# Patient Record
Sex: FEMALE | Race: ASIAN | Employment: FULL TIME | URBAN - METROPOLITAN AREA
[De-identification: names, ages, dates, MRNs, and addresses within clinical notes are randomized per-mention and may not be internally consistent; named-entity substitution may affect disease eponyms.]

---

## 2018-01-18 ENCOUNTER — RADIANT APPOINTMENT (OUTPATIENT)
Dept: ULTRASOUND IMAGING | Facility: CLINIC | Age: 28
End: 2018-01-18
Attending: OBSTETRICS & GYNECOLOGY

## 2018-01-18 DIAGNOSIS — E34.9 HORMONE DISTURBANCE: ICD-10-CM

## 2018-01-18 DIAGNOSIS — N97.9 PRIMARY FEMALE INFERTILITY: ICD-10-CM

## 2018-01-18 LAB
ESTRADIOL SERPL-MCNC: 26 PG/ML
PROGEST SERPL-MCNC: 0.4 NG/ML

## 2018-01-18 PROCEDURE — 76830 TRANSVAGINAL US NON-OB: CPT

## 2018-01-18 PROCEDURE — 82670 ASSAY OF TOTAL ESTRADIOL: CPT | Performed by: OBSTETRICS & GYNECOLOGY

## 2018-01-18 PROCEDURE — 84144 ASSAY OF PROGESTERONE: CPT | Performed by: OBSTETRICS & GYNECOLOGY

## 2018-01-18 PROCEDURE — 76856 US EXAM PELVIC COMPLETE: CPT

## 2018-01-18 PROCEDURE — 36415 COLL VENOUS BLD VENIPUNCTURE: CPT | Performed by: OBSTETRICS & GYNECOLOGY

## 2018-01-26 ENCOUNTER — RADIANT APPOINTMENT (OUTPATIENT)
Dept: ULTRASOUND IMAGING | Facility: CLINIC | Age: 28
End: 2018-01-26
Attending: OBSTETRICS & GYNECOLOGY

## 2018-01-26 DIAGNOSIS — N97.9 PRIMARY FEMALE INFERTILITY: ICD-10-CM

## 2018-01-26 DIAGNOSIS — E34.9 HORMONE DISTURBANCE: ICD-10-CM

## 2018-01-26 LAB
ESTRADIOL SERPL-MCNC: 244 PG/ML
PROGEST SERPL-MCNC: 0.5 NG/ML

## 2018-01-26 PROCEDURE — 84144 ASSAY OF PROGESTERONE: CPT | Performed by: OBSTETRICS & GYNECOLOGY

## 2018-01-26 PROCEDURE — 76830 TRANSVAGINAL US NON-OB: CPT

## 2018-01-26 PROCEDURE — 36415 COLL VENOUS BLD VENIPUNCTURE: CPT | Performed by: OBSTETRICS & GYNECOLOGY

## 2018-01-26 PROCEDURE — 82670 ASSAY OF TOTAL ESTRADIOL: CPT | Performed by: OBSTETRICS & GYNECOLOGY

## 2018-02-02 ENCOUNTER — RADIANT APPOINTMENT (OUTPATIENT)
Dept: ULTRASOUND IMAGING | Facility: CLINIC | Age: 28
End: 2018-02-02
Attending: OBSTETRICS & GYNECOLOGY

## 2018-02-02 DIAGNOSIS — N97.9 PRIMARY FEMALE INFERTILITY: ICD-10-CM

## 2018-02-02 DIAGNOSIS — E34.9 HORMONE DISTURBANCE: ICD-10-CM

## 2018-02-02 LAB
ESTRADIOL SERPL-MCNC: 250 PG/ML
PROGEST SERPL-MCNC: 0.7 NG/ML

## 2018-02-02 PROCEDURE — 84144 ASSAY OF PROGESTERONE: CPT | Performed by: OBSTETRICS & GYNECOLOGY

## 2018-02-02 PROCEDURE — 76830 TRANSVAGINAL US NON-OB: CPT

## 2018-02-02 PROCEDURE — 82670 ASSAY OF TOTAL ESTRADIOL: CPT | Performed by: OBSTETRICS & GYNECOLOGY

## 2018-02-02 PROCEDURE — 36415 COLL VENOUS BLD VENIPUNCTURE: CPT | Performed by: OBSTETRICS & GYNECOLOGY

## 2018-02-06 DIAGNOSIS — N97.9 PRIMARY FEMALE INFERTILITY: Primary | ICD-10-CM

## 2018-02-06 DIAGNOSIS — E34.9 HORMONE DISORDER: ICD-10-CM

## 2018-02-06 DIAGNOSIS — Z32.00 UNCONFIRMED PREGNANCY: ICD-10-CM

## 2018-02-16 DIAGNOSIS — Z32.00 UNCONFIRMED PREGNANCY: ICD-10-CM

## 2018-02-16 DIAGNOSIS — N97.9 PRIMARY FEMALE INFERTILITY: ICD-10-CM

## 2018-02-16 DIAGNOSIS — E34.9 HORMONE DISORDER: ICD-10-CM

## 2018-02-16 LAB
B-HCG SERPL-ACNC: 193 IU/L (ref 0–5)
ESTRADIOL SERPL-MCNC: 218 PG/ML
PROGEST SERPL-MCNC: 34.7 NG/ML

## 2018-02-16 PROCEDURE — 84702 CHORIONIC GONADOTROPIN TEST: CPT | Performed by: OBSTETRICS & GYNECOLOGY

## 2018-02-16 PROCEDURE — 82670 ASSAY OF TOTAL ESTRADIOL: CPT | Performed by: OBSTETRICS & GYNECOLOGY

## 2018-02-16 PROCEDURE — 36415 COLL VENOUS BLD VENIPUNCTURE: CPT | Performed by: OBSTETRICS & GYNECOLOGY

## 2018-02-16 PROCEDURE — 84144 ASSAY OF PROGESTERONE: CPT | Performed by: OBSTETRICS & GYNECOLOGY

## 2018-02-19 DIAGNOSIS — E34.9 HORMONE DISORDER: ICD-10-CM

## 2018-02-19 DIAGNOSIS — Z32.00 UNCONFIRMED PREGNANCY: ICD-10-CM

## 2018-02-19 DIAGNOSIS — N97.9 PRIMARY FEMALE INFERTILITY: ICD-10-CM

## 2018-02-19 LAB
B-HCG SERPL-ACNC: 635 IU/L (ref 0–5)
ESTRADIOL SERPL-MCNC: 298 PG/ML
PROGEST SERPL-MCNC: 40.3 NG/ML

## 2018-02-19 PROCEDURE — 36415 COLL VENOUS BLD VENIPUNCTURE: CPT | Performed by: OBSTETRICS & GYNECOLOGY

## 2018-02-19 PROCEDURE — 84702 CHORIONIC GONADOTROPIN TEST: CPT | Performed by: OBSTETRICS & GYNECOLOGY

## 2018-02-19 PROCEDURE — 84144 ASSAY OF PROGESTERONE: CPT | Performed by: OBSTETRICS & GYNECOLOGY

## 2018-02-19 PROCEDURE — 82670 ASSAY OF TOTAL ESTRADIOL: CPT | Performed by: OBSTETRICS & GYNECOLOGY

## 2018-03-05 ENCOUNTER — RADIANT APPOINTMENT (OUTPATIENT)
Dept: ULTRASOUND IMAGING | Facility: CLINIC | Age: 28
End: 2018-03-05
Attending: OBSTETRICS & GYNECOLOGY

## 2018-03-05 ENCOUNTER — TELEPHONE (OUTPATIENT)
Dept: FAMILY MEDICINE | Facility: CLINIC | Age: 28
End: 2018-03-05

## 2018-03-05 DIAGNOSIS — Z34.90: ICD-10-CM

## 2018-03-05 PROCEDURE — 76817 TRANSVAGINAL US OBSTETRIC: CPT

## 2018-03-05 NOTE — TELEPHONE ENCOUNTER
[facility] would like the results of the ultrasound fax# [phone] attn: Esther. Her phone # [phone]

## 2018-03-12 ENCOUNTER — RADIANT APPOINTMENT (OUTPATIENT)
Dept: ULTRASOUND IMAGING | Facility: CLINIC | Age: 28
End: 2018-03-12
Attending: OBSTETRICS & GYNECOLOGY

## 2018-03-12 DIAGNOSIS — Z34.90: ICD-10-CM

## 2018-03-12 PROCEDURE — 76817 TRANSVAGINAL US OBSTETRIC: CPT

## 2018-03-13 ENCOUNTER — TELEPHONE (OUTPATIENT)
Dept: FAMILY MEDICINE | Facility: CLINIC | Age: 28
End: 2018-03-13

## 2018-03-19 ENCOUNTER — RADIANT APPOINTMENT (OUTPATIENT)
Dept: ULTRASOUND IMAGING | Facility: CLINIC | Age: 28
End: 2018-03-19
Attending: OBSTETRICS & GYNECOLOGY

## 2018-03-19 DIAGNOSIS — Z34.90: ICD-10-CM

## 2018-03-19 PROCEDURE — 76817 TRANSVAGINAL US OBSTETRIC: CPT

## 2019-10-14 DIAGNOSIS — N97.9 PRIMARY FEMALE INFERTILITY: Primary | ICD-10-CM

## 2019-10-14 DIAGNOSIS — E66.89 OBESITY OF ENDOCRINE ORIGIN: ICD-10-CM

## 2019-10-15 DIAGNOSIS — N97.9 PRIMARY FEMALE INFERTILITY: ICD-10-CM

## 2019-10-15 DIAGNOSIS — E66.89 OBESITY OF ENDOCRINE ORIGIN: ICD-10-CM

## 2019-10-15 LAB
ESTRADIOL SERPL-MCNC: 138 PG/ML
PROGEST SERPL-MCNC: 1.7 NG/ML

## 2019-10-15 PROCEDURE — 36415 COLL VENOUS BLD VENIPUNCTURE: CPT | Performed by: OBSTETRICS & GYNECOLOGY

## 2019-10-15 PROCEDURE — 84144 ASSAY OF PROGESTERONE: CPT | Performed by: OBSTETRICS & GYNECOLOGY

## 2019-10-15 PROCEDURE — 82670 ASSAY OF TOTAL ESTRADIOL: CPT | Performed by: OBSTETRICS & GYNECOLOGY

## 2019-10-28 DIAGNOSIS — E66.89 OBESITY OF ENDOCRINE ORIGIN: ICD-10-CM

## 2019-10-28 DIAGNOSIS — N97.9 PRIMARY FEMALE INFERTILITY: ICD-10-CM

## 2019-10-28 LAB
ESTRADIOL SERPL-MCNC: 34 PG/ML
PROGEST SERPL-MCNC: 0.5 NG/ML

## 2019-10-28 PROCEDURE — 84144 ASSAY OF PROGESTERONE: CPT | Performed by: OBSTETRICS & GYNECOLOGY

## 2019-10-28 PROCEDURE — 82670 ASSAY OF TOTAL ESTRADIOL: CPT | Performed by: OBSTETRICS & GYNECOLOGY

## 2019-10-28 PROCEDURE — 36415 COLL VENOUS BLD VENIPUNCTURE: CPT | Performed by: OBSTETRICS & GYNECOLOGY

## 2019-10-29 DIAGNOSIS — E66.89 OBESITY OF ENDOCRINE ORIGIN: ICD-10-CM

## 2019-10-29 DIAGNOSIS — N97.9 PRIMARY FEMALE INFERTILITY: Primary | ICD-10-CM

## 2019-11-04 DIAGNOSIS — N97.9 PRIMARY FEMALE INFERTILITY: ICD-10-CM

## 2019-11-04 DIAGNOSIS — E66.89 OBESITY OF ENDOCRINE ORIGIN: ICD-10-CM

## 2019-11-04 LAB
ESTRADIOL SERPL-MCNC: 251 PG/ML
PROGEST SERPL-MCNC: 0.4 NG/ML

## 2019-11-04 PROCEDURE — 82670 ASSAY OF TOTAL ESTRADIOL: CPT | Performed by: OBSTETRICS & GYNECOLOGY

## 2019-11-04 PROCEDURE — 84144 ASSAY OF PROGESTERONE: CPT | Performed by: OBSTETRICS & GYNECOLOGY

## 2019-11-04 PROCEDURE — 36415 COLL VENOUS BLD VENIPUNCTURE: CPT | Performed by: OBSTETRICS & GYNECOLOGY

## 2019-11-11 DIAGNOSIS — N97.9 PRIMARY FEMALE INFERTILITY: ICD-10-CM

## 2019-11-11 DIAGNOSIS — E66.89 OBESITY OF ENDOCRINE ORIGIN: ICD-10-CM

## 2019-11-11 LAB
ESTRADIOL SERPL-MCNC: 282 PG/ML
PROGEST SERPL-MCNC: 0.5 NG/ML

## 2019-11-11 PROCEDURE — 82670 ASSAY OF TOTAL ESTRADIOL: CPT | Performed by: OBSTETRICS & GYNECOLOGY

## 2019-11-11 PROCEDURE — 36415 COLL VENOUS BLD VENIPUNCTURE: CPT | Performed by: OBSTETRICS & GYNECOLOGY

## 2019-11-11 PROCEDURE — 84144 ASSAY OF PROGESTERONE: CPT | Performed by: OBSTETRICS & GYNECOLOGY

## 2019-11-13 DIAGNOSIS — E66.89 OBESITY OF ENDOCRINE ORIGIN: Primary | ICD-10-CM

## 2019-11-13 DIAGNOSIS — Z32.00 PREGNANCY EXAMINATION OR TEST, PREGNANCY UNCONFIRMED: ICD-10-CM

## 2019-11-29 DIAGNOSIS — Z32.00 PREGNANCY EXAMINATION OR TEST, PREGNANCY UNCONFIRMED: ICD-10-CM

## 2019-11-29 DIAGNOSIS — E66.89 OBESITY OF ENDOCRINE ORIGIN: ICD-10-CM

## 2019-11-29 LAB
B-HCG SERPL-ACNC: 319 IU/L (ref 0–5)
PROGEST SERPL-MCNC: 55.2 NG/ML

## 2019-11-29 PROCEDURE — 84144 ASSAY OF PROGESTERONE: CPT | Performed by: OBSTETRICS & GYNECOLOGY

## 2019-11-29 PROCEDURE — 84702 CHORIONIC GONADOTROPIN TEST: CPT | Performed by: OBSTETRICS & GYNECOLOGY

## 2019-11-29 PROCEDURE — 36415 COLL VENOUS BLD VENIPUNCTURE: CPT | Performed by: OBSTETRICS & GYNECOLOGY

## 2019-12-02 DIAGNOSIS — E66.89 OBESITY OF ENDOCRINE ORIGIN: ICD-10-CM

## 2019-12-02 DIAGNOSIS — Z32.00 PREGNANCY EXAMINATION OR TEST, PREGNANCY UNCONFIRMED: ICD-10-CM

## 2019-12-02 LAB
B-HCG SERPL-ACNC: 1316 IU/L (ref 0–5)
PROGEST SERPL-MCNC: 39.6 NG/ML

## 2019-12-02 PROCEDURE — 36415 COLL VENOUS BLD VENIPUNCTURE: CPT | Performed by: OBSTETRICS & GYNECOLOGY

## 2019-12-02 PROCEDURE — 84144 ASSAY OF PROGESTERONE: CPT | Performed by: OBSTETRICS & GYNECOLOGY

## 2019-12-02 PROCEDURE — 84702 CHORIONIC GONADOTROPIN TEST: CPT | Performed by: OBSTETRICS & GYNECOLOGY
